# Patient Record
Sex: MALE | Race: WHITE | NOT HISPANIC OR LATINO | URBAN - METROPOLITAN AREA
[De-identification: names, ages, dates, MRNs, and addresses within clinical notes are randomized per-mention and may not be internally consistent; named-entity substitution may affect disease eponyms.]

---

## 2020-08-10 ENCOUNTER — LAB OUTSIDE AN ENCOUNTER (OUTPATIENT)
Dept: URBAN - METROPOLITAN AREA CLINIC 74 | Facility: CLINIC | Age: 80
End: 2020-08-10

## 2020-08-10 ENCOUNTER — OFFICE VISIT (OUTPATIENT)
Dept: URBAN - METROPOLITAN AREA CLINIC 74 | Facility: CLINIC | Age: 80
End: 2020-08-10
Payer: MEDICARE

## 2020-08-10 DIAGNOSIS — K21.9 GASTROESOPHAGEAL REFLUX DISEASE, ESOPHAGITIS PRESENCE NOT SPECIFIED: ICD-10-CM

## 2020-08-10 DIAGNOSIS — K57.30 ACQUIRED DIVERTICULOSIS OF COLON: ICD-10-CM

## 2020-08-10 DIAGNOSIS — Z86.010 PERSONAL HISTORY OF COLONIC POLYPS: ICD-10-CM

## 2020-08-10 DIAGNOSIS — R13.19 OTHER DYSPHAGIA: ICD-10-CM

## 2020-08-10 PROCEDURE — G8754 DIAS BP LESS 90: HCPCS | Performed by: INTERNAL MEDICINE

## 2020-08-10 PROCEDURE — 99204 OFFICE O/P NEW MOD 45 MIN: CPT | Performed by: INTERNAL MEDICINE

## 2020-08-10 PROCEDURE — 99244 OFF/OP CNSLTJ NEW/EST MOD 40: CPT | Performed by: INTERNAL MEDICINE

## 2020-08-10 PROCEDURE — G8752 SYS BP LESS 140: HCPCS | Performed by: INTERNAL MEDICINE

## 2020-08-10 RX ORDER — SODIUM, POTASSIUM,MAG SULFATES 17.5-3.13G
345 ML SOLUTION, RECONSTITUTED, ORAL ORAL
Qty: 1 | Refills: 0 | OUTPATIENT

## 2020-08-10 NOTE — HPI-TODAY'S VISIT:
The patient is an 80-year-old white male patient of Dr. Hernández who is referred for consultation.  The patient states that he was recently seen by primary care after he thought he had developed a hernia and had a CT of the abdomen and pelvis on July 22, 2020.  At the time he was found to have fatty liver, sigmoid diverticulosis with possible early diverticulitis for which he was treated with antibiotics, a small hiatal hernia, and enlarged prostate.  He states that just about a week before that he was treated for a mild case of pneumonia with antibiotics. The patient has a history of colonic polyps, his last colonoscopy dates back about 4 years ago, he has multiple family members with colonic polyps including mother, father, sister and brother.  The patient currently states having normal bowel movements, he had transient constipation while on antibiotics.  He denied any rectal bleeding, abdominal pain, hematochezia or melena. The patient has a history of gastroesophageal reflux, used to take ranitidine, he was able to get off ranitidine but at times does have heartburn and complaints of pill dysphagia.  He had one episode of severe dysphagia to solid food a year ago with a complete esophageal occlusion which lasted a few minutes and resolved spontaneously. The patient is to be scheduled to have an EGD, will follow antireflux measures and diet.  Will be also scheduled to have a surveillance colonoscopy.  Benefits potential complications and alternatives to both procedures have been disclosed.  We will obtain records from [facility] for review.

## 2020-09-14 ENCOUNTER — OFFICE VISIT (OUTPATIENT)
Dept: URBAN - METROPOLITAN AREA SURGERY CENTER 30 | Facility: SURGERY CENTER | Age: 80
End: 2020-09-14
Payer: MEDICARE

## 2020-09-14 ENCOUNTER — CLAIMS CREATED FROM THE CLAIM WINDOW (OUTPATIENT)
Dept: URBAN - METROPOLITAN AREA CLINIC 4 | Facility: CLINIC | Age: 80
End: 2020-09-14
Payer: MEDICARE

## 2020-09-14 DIAGNOSIS — D12.2 ADENOMA OF ASCENDING COLON: ICD-10-CM

## 2020-09-14 DIAGNOSIS — K62.1 DYSPLASTIC POLYP OF RECTUM: ICD-10-CM

## 2020-09-14 DIAGNOSIS — K21.9 ACID REFLUX: ICD-10-CM

## 2020-09-14 DIAGNOSIS — D12.2 BENIGN NEOPLASM OF ASCENDING COLON: ICD-10-CM

## 2020-09-14 DIAGNOSIS — K21.0 GASTRO-ESOPHAGEAL REFLUX DISEASE WITH ESOPHAGITIS: ICD-10-CM

## 2020-09-14 DIAGNOSIS — D12.8 BENIGN NEOPLASM OF RECTUM: ICD-10-CM

## 2020-09-14 DIAGNOSIS — Z86.010 H/O ADENOMATOUS POLYP OF COLON: ICD-10-CM

## 2020-09-14 PROCEDURE — 43239 EGD BIOPSY SINGLE/MULTIPLE: CPT | Performed by: INTERNAL MEDICINE

## 2020-09-14 PROCEDURE — G9936 PMH PLYP/NEO CO/RECT/JUN/ANS: HCPCS | Performed by: INTERNAL MEDICINE

## 2020-09-14 PROCEDURE — G8907 PT DOC NO EVENTS ON DISCHARG: HCPCS | Performed by: INTERNAL MEDICINE

## 2020-09-14 PROCEDURE — 45380 COLONOSCOPY AND BIOPSY: CPT | Performed by: INTERNAL MEDICINE

## 2020-09-14 PROCEDURE — 88305 TISSUE EXAM BY PATHOLOGIST: CPT | Performed by: PATHOLOGY

## 2020-09-14 RX ORDER — SODIUM, POTASSIUM,MAG SULFATES 17.5-3.13G
345 ML SOLUTION, RECONSTITUTED, ORAL ORAL
Qty: 1 | Refills: 0 | Status: ACTIVE | COMMUNITY

## 2020-09-27 PROBLEM — 429969003: Status: ACTIVE | Noted: 2020-08-10

## 2020-09-27 PROBLEM — 118361000119100: Status: ACTIVE | Noted: 2020-08-10

## 2020-09-27 PROBLEM — 397881000: Status: ACTIVE | Noted: 2020-08-10

## 2020-09-27 PROBLEM — 59621000: Status: ACTIVE | Noted: 2020-08-10

## 2020-09-27 PROBLEM — 40890009: Status: ACTIVE | Noted: 2020-08-10

## 2020-09-27 PROBLEM — 721691004: Status: ACTIVE | Noted: 2020-09-27

## 2020-09-27 PROBLEM — 197321007: Status: ACTIVE | Noted: 2020-08-10

## 2020-09-27 PROBLEM — 162863004: Status: ACTIVE | Noted: 2020-08-10

## 2020-09-27 PROBLEM — 266435005: Status: ACTIVE | Noted: 2020-09-27

## 2020-09-27 PROBLEM — 428054006: Status: ACTIVE | Noted: 2020-09-27

## 2020-09-27 PROBLEM — 428283002: Status: ACTIVE | Noted: 2020-08-10

## 2020-09-27 PROBLEM — 84089009: Status: ACTIVE | Noted: 2020-09-27

## 2020-09-29 ENCOUNTER — OFFICE VISIT (OUTPATIENT)
Dept: URBAN - METROPOLITAN AREA CLINIC 74 | Facility: CLINIC | Age: 80
End: 2020-09-29
Payer: MEDICARE

## 2020-09-29 DIAGNOSIS — K62.1 HYPERPLASTIC RECTAL POLYP: ICD-10-CM

## 2020-09-29 DIAGNOSIS — K57.90 DIVERTICULOSIS: ICD-10-CM

## 2020-09-29 DIAGNOSIS — K21.9 GERD WITHOUT ESOPHAGITIS: ICD-10-CM

## 2020-09-29 DIAGNOSIS — R13.10 ESOPHAGEAL DYSPHAGIA: ICD-10-CM

## 2020-09-29 DIAGNOSIS — Z86.010 PERSONAL HISTORY OF COLONIC POLYPS: ICD-10-CM

## 2020-09-29 DIAGNOSIS — Z83.71 FAMILY HISTORY OF COLONIC POLYPS: ICD-10-CM

## 2020-09-29 DIAGNOSIS — Z87.19 HISTORY OF DIVERTICULITIS OF COLON: ICD-10-CM

## 2020-09-29 DIAGNOSIS — D12.2 ADENOMATOUS POLYP OF ASCENDING COLON: ICD-10-CM

## 2020-09-29 DIAGNOSIS — K44.9 HIATAL HERNIA: ICD-10-CM

## 2020-09-29 DIAGNOSIS — I10 ESSENTIAL HYPERTENSION: ICD-10-CM

## 2020-09-29 DIAGNOSIS — E66.3 OVERWEIGHT (BMI 25.0-29.9): ICD-10-CM

## 2020-09-29 DIAGNOSIS — K76.0 FATTY LIVER: ICD-10-CM

## 2020-09-29 PROCEDURE — G8419 CALC BMI OUT NRM PARAM NOF/U: HCPCS | Performed by: INTERNAL MEDICINE

## 2020-09-29 PROCEDURE — G8752 SYS BP LESS 140: HCPCS | Performed by: INTERNAL MEDICINE

## 2020-09-29 PROCEDURE — 1036F TOBACCO NON-USER: CPT | Performed by: INTERNAL MEDICINE

## 2020-09-29 PROCEDURE — G8427 DOCREV CUR MEDS BY ELIG CLIN: HCPCS | Performed by: INTERNAL MEDICINE

## 2020-09-29 PROCEDURE — 99213 OFFICE O/P EST LOW 20 MIN: CPT | Performed by: INTERNAL MEDICINE

## 2020-09-29 PROCEDURE — G8754 DIAS BP LESS 90: HCPCS | Performed by: INTERNAL MEDICINE

## 2020-09-29 RX ORDER — SODIUM, POTASSIUM,MAG SULFATES 17.5-3.13G
345 ML SOLUTION, RECONSTITUTED, ORAL ORAL
Qty: 1 | Refills: 0 | Status: DISCONTINUED | COMMUNITY

## 2020-09-29 RX ORDER — SODIUM, POTASSIUM,MAG SULFATES 17.5-3.13G
345 ML SOLUTION, RECONSTITUTED, ORAL ORAL
Qty: 1 | Refills: 0 | OUTPATIENT

## 2020-09-29 NOTE — HPI-TODAY'S VISIT:
The patient is an 80-year-old white male patient of Dr. Hernández who is referred for consultation.  The patient states that he was recently seen by primary care after he thought he had developed a hernia and had a CT of the abdomen and pelvis on July 22, 2020.  At the time he was found to have fatty liver, sigmoid diverticulosis with possible early diverticulitis for which he was treated with antibiotics, a small hiatal hernia, and enlarged prostate.  He states that just about a week before that he was treated for a mild case of pneumonia with antibiotics. The patient has a history of colonic polyps, his last colonoscopy dates back about 4 years ago, he has multiple family members with colonic polyps including mother, father, sister and brother.  The patient currently states having normal bowel movements, he had transient constipation while on antibiotics.  He denied any rectal bleeding, abdominal pain, hematochezia or melena. The patient has a history of gastroesophageal reflux, used to take ranitidine, he was able to get off ranitidine but at times does have heartburn and complaints of pill dysphagia.  He had one episode of severe dysphagia to solid food a year ago with a complete esophageal occlusion which lasted a few minutes and resolved spontaneously. The patient is to be scheduled to have an EGD, will follow antireflux measures and diet.  Will be also scheduled to have a surveillance colonoscopy.  Benefits potential complications and alternatives to both procedures have been disclosed.  We will obtain records from [facility] for review. Today September 29, 2020 the patient returns for follow-up visit, the patient was last seen in the office August 10, 2020 when he was referred for consultation, the patient had a CT of the abdomen and pelvis on July 22, 2020 and was found to have fatty liver, sigmoid diverticulosis with possible early diverticulitis for which she was treated with antibiotics.  The patient was found to have a small hiatal hernia, and enlarged prostate. The patient stated that he had been treated for pneumonia with antibiotics about a week before the CT was done. The patient has a history of colonic polyps, his last colonoscopy dates back about 4 years, the patient has multiple family members with colonic polyps including mother, father, sister and brother. At the time of the visit the patient was having normal bowel movements, he did have transient constipation while on antibiotics, he denied any rectal bleeding or hematochezia. The patient has a history of gastroesophageal reflux for which he used to take ranitidine, he was able to get off ranitidine but at times does have heartburn and complaints of pill dysphagia.  He claims that about a year ago he had transient complete esophageal dysphagia while eating solid food, it resolved spontaneously.  The patient was scheduled to have an EGD, was advised to follow antireflux measures and diet, he was also scheduled to have a surveillance colonoscopy. The colonoscopy was performed on September 14, 2020 and it showed a 4 mm ascending colon adenomatous polyp which was removed with cold biopsy forceps, 2 sessile hyperplastic rectal polyps measuring 2 mm which were removed with cold biopsy forceps, multiple small and large amount of the diverticuli were found in the sigmoid and descending colon as well as nonbleeding internal hemorrhoids.  The patient will be advised to have a surveillance colonoscopy in 5 years. The EGD was performed on September 14, 2020 and it showed normal duodenal mucosa, a medium sized hiatal hernia, benign fundic gland polyp and esophageal changes compatible with esophagitis without Carmona's. Today the patient states that besides having occasional diet related heartburn and very rare transient distal esophageal dysphagia to very dry foods he is doing well, the patient takes over-the-counter Pepcid AC very occasionally. The patient's bowel movements remain normal. The patient was instructed on the use of antireflux measures and diet, he will also follow a high fiber diet and will return for a follow-up visit in 1 year.

## 2023-07-31 ENCOUNTER — P2P PATIENT RECORD (OUTPATIENT)
Age: 83
End: 2023-07-31

## 2023-09-21 ENCOUNTER — OFFICE VISIT (OUTPATIENT)
Dept: URBAN - METROPOLITAN AREA CLINIC 73 | Facility: CLINIC | Age: 83
End: 2023-09-21
Payer: MEDICARE

## 2023-09-21 ENCOUNTER — OFFICE VISIT (OUTPATIENT)
Dept: URBAN - METROPOLITAN AREA CLINIC 74 | Facility: CLINIC | Age: 83
End: 2023-09-21
Payer: MEDICARE

## 2023-09-21 VITALS
BODY MASS INDEX: 27.43 KG/M2 | HEART RATE: 57 BPM | SYSTOLIC BLOOD PRESSURE: 130 MMHG | DIASTOLIC BLOOD PRESSURE: 80 MMHG | OXYGEN SATURATION: 97 % | WEIGHT: 181 LBS | HEIGHT: 68 IN | TEMPERATURE: 97.3 F

## 2023-09-21 DIAGNOSIS — I10 ESSENTIAL HYPERTENSION: ICD-10-CM

## 2023-09-21 DIAGNOSIS — Z86.010 PERSONAL HISTORY OF COLONIC POLYPS: ICD-10-CM

## 2023-09-21 DIAGNOSIS — K76.0 FATTY LIVER: ICD-10-CM

## 2023-09-21 DIAGNOSIS — K21.9 GERD WITHOUT ESOPHAGITIS: ICD-10-CM

## 2023-09-21 DIAGNOSIS — E66.3 OVERWEIGHT (BMI 25.0-29.9): ICD-10-CM

## 2023-09-21 DIAGNOSIS — D64.89 OTHER SPECIFIED ANEMIAS: ICD-10-CM

## 2023-09-21 DIAGNOSIS — K57.90 DIVERTICULOSIS: ICD-10-CM

## 2023-09-21 DIAGNOSIS — Z83.71 FAMILY HISTORY OF COLONIC POLYPS: ICD-10-CM

## 2023-09-21 DIAGNOSIS — D64.9 ANEMIA, UNSPECIFIED TYPE: ICD-10-CM

## 2023-09-21 DIAGNOSIS — K44.9 HIATAL HERNIA: ICD-10-CM

## 2023-09-21 DIAGNOSIS — Z87.19 HISTORY OF DIVERTICULITIS OF COLON: ICD-10-CM

## 2023-09-21 DIAGNOSIS — D63.8 ANEMIA IN OTHER CHRONIC DISEASES CLASSIFIED ELSEWHERE: ICD-10-CM

## 2023-09-21 PROBLEM — 234347009: Status: ACTIVE | Noted: 2023-09-21

## 2023-09-21 PROBLEM — 271737000: Status: ACTIVE | Noted: 2023-09-21

## 2023-09-21 PROCEDURE — 99244 OFF/OP CNSLTJ NEW/EST MOD 40: CPT | Performed by: INTERNAL MEDICINE

## 2023-09-21 PROCEDURE — 99214 OFFICE O/P EST MOD 30 MIN: CPT | Performed by: INTERNAL MEDICINE

## 2023-09-21 PROCEDURE — G0328 FECAL BLOOD SCRN IMMUNOASSAY: HCPCS | Performed by: INTERNAL MEDICINE

## 2023-09-21 RX ORDER — METOPROLOL SUCCINATE 25 MG/1
1 TABLET TABLET, FILM COATED, EXTENDED RELEASE ORAL ONCE A DAY
Status: ACTIVE | COMMUNITY

## 2023-09-21 RX ORDER — SODIUM, POTASSIUM,MAG SULFATES 17.5-3.13G
345 ML SOLUTION, RECONSTITUTED, ORAL ORAL
Qty: 1 | Refills: 0 | Status: ACTIVE | COMMUNITY

## 2023-09-21 RX ORDER — OLMESARTAN MEDOXOMIL 20 MG/1
1 TABLET TABLET, FILM COATED ORAL ONCE A DAY
Status: ACTIVE | COMMUNITY

## 2023-09-21 RX ORDER — SODIUM, POTASSIUM,MAG SULFATES 17.5-3.13G
345 ML SOLUTION, RECONSTITUTED, ORAL ORAL
Qty: 1 | Refills: 0 | OUTPATIENT

## 2023-09-21 NOTE — HPI-TODAY'S VISIT:
The patient is an 80-year-old white male patient of Dr. Hernández who is referred for consultation.  The patient states that he was recently seen by primary care after he thought he had developed a hernia and had a CT of the abdomen and pelvis on July 22, 2020.  At the time he was found to have fatty liver, sigmoid diverticulosis with possible early diverticulitis for which he was treated with antibiotics, a small hiatal hernia, and enlarged prostate.  He states that just about a week before that he was treated for a mild case of pneumonia with antibiotics. The patient has a history of colonic polyps, his last colonoscopy dates back about 4 years ago, he has multiple family members with colonic polyps including mother, father, sister and brother.  The patient currently states having normal bowel movements, he had transient constipation while on antibiotics.  He denied any rectal bleeding, abdominal pain, hematochezia or melena. The patient has a history of gastroesophageal reflux, used to take ranitidine, he was able to get off ranitidine but at times does have heartburn and complaints of pill dysphagia.  He had one episode of severe dysphagia to solid food a year ago with a complete esophageal occlusion which lasted a few minutes and resolved spontaneously. The patient is to be scheduled to have an EGD, will follow antireflux measures and diet.  Will be also scheduled to have a surveillance colonoscopy.  Benefits potential complications and alternatives to both procedures have been disclosed.  We will obtain records from [facility] for review. Today September 29, 2020 the patient returns for follow-up visit, the patient was last seen in the office August 10, 2020 when he was referred for consultation, the patient had a CT of the abdomen and pelvis on July 22, 2020 and was found to have fatty liver, sigmoid diverticulosis with possible early diverticulitis for which she was treated with antibiotics.  The patient was found to have a small hiatal hernia, and enlarged prostate. The patient stated that he had been treated for pneumonia with antibiotics about a week before the CT was done. The patient has a history of colonic polyps, his last colonoscopy dates back about 4 years, the patient has multiple family members with colonic polyps including mother, father, sister and brother. At the time of the visit the patient was having normal bowel movements, he did have transient constipation while on antibiotics, he denied any rectal bleeding or hematochezia. The patient has a history of gastroesophageal reflux for which he used to take ranitidine, he was able to get off ranitidine but at times does have heartburn and complaints of pill dysphagia.  He claims that about a year ago he had transient complete esophageal dysphagia while eating solid food, it resolved spontaneously.  The patient was scheduled to have an EGD, was advised to follow antireflux measures and diet, he was also scheduled to have a surveillance colonoscopy. The colonoscopy was performed on September 14, 2020 and it showed a 4 mm ascending colon adenomatous polyp which was removed with cold biopsy forceps, 2 sessile hyperplastic rectal polyps measuring 2 mm which were removed with cold biopsy forceps, multiple small and large amount of the diverticuli were found in the sigmoid and descending colon as well as nonbleeding internal hemorrhoids.  The patient will be advised to have a surveillance colonoscopy in 5 years. The EGD was performed on September 14, 2020 and it showed normal duodenal mucosa, a medium sized hiatal hernia, benign fundic gland polyp and esophageal changes compatible with esophagitis without Carmona's. Today the patient states that besides having occasional diet related heartburn and very rare transient distal esophageal dysphagia to very dry foods he is doing well, the patient takes over-the-counter Pepcid AC very occasionally. The patient's bowel movements remain normal. The patient was instructed on the use of antireflux measures and diet, he will also follow a high fiber diet and will return for a follow-up visit in 1 year.   Today September 21 2023 the patient returns for a follow-up visit, the patient was last seen on September 29, 2020 with adenomatous and hyperplastic colon polyps, GERD, hiatal hernia, esophageal dysphagia, colonic diverticulosis with history of diverticulitis, fatty liver, the patient was overweight, the patient had family history of colon polyps.  At the time of the last visit the patient had occasional diet related heartburn, very transient distal esophageal dysphagia with very dry foods, the patient was taking over-the-counter Pepcid AC very occasionally, bowel movements were normal.  At the time the patient was advised to follow a high-fiber diet as well as antireflux measures and diet and return for a follow-up visit in 1 year.  The patient had an EGD performed September 14, 2020 that showed normal duodenal mucosa, a medium sized hiatal hernia, benign fundic gland polyposis and esophageal changes compatible with esophagitis without Carmona's.   A Colonoscopy performed September 14, 2020 showed a 4 mm ascending adenomatous polyp removed with cold biopsy forceps, 2 sessile hyperplastic rectal polyps measuring 2 mm each removed with cold biopsy forceps.  The patient had many diverticula throughout the sigmoid and descending colon and internal hemorrhoids.  At the time the patient was advised to get a colonoscopy in 2025.  Laboratory obtained on September 5, 2023 showed an H&H of 11.5 and 36.2, white cell count 9.84, normal indices, the patient had normal renal function and normal LFTs.  A CT of the chest obtained May 5, 2023 revealed a small hiatal hernia, a 2 cm left thyroid nodule, and a 4.2 cm ascending thoracic aortic aneurysm.  There was some axillary lymphadenopathy and the patient had pulmonary nodules.  The patient was also found to have colonic diverticulosis in the descending colon.  Today the patient returns to the office stating that he did undergo a low back operation where they had a cage bleeding to his lower spine and unfortunately it got complicated with a Pseudomonas infection which was treated with Cipro for 1 year.  The patient just came off antibiotics just over a month ago.  The patient while having his evaluation by primary care was found to have iron deficiency on August 23, 2023 with a serum iron of 32, percent saturation of 13, ferritin 181.  At the time his folate was normal.  The patient had normal LFTs and normal renal function.  As previously stated H&H 11.5 and 36.2.  The patient was then advised to be seen by gastroenterology for possible GI blood loss.  Currently the patient denies having any dysphagia, odynophagia, nausea, vomiting, hematemesis or melena, there has been no hematochezia or rectal bleeding.  The patient was given iron replacement tablets which she took for 10 days and could not tolerate them due to abdominal discomfort and constipation so they were suspended.  The patient is currently having regular formed stools with no blood, mostly type IV on the Gray scale.  The patient has been recommended to get stool checked for occult blood, we will repeat iron studies and ferritin.  We will make further recommendations once we get results.

## 2023-09-22 LAB
FERRITIN, SERUM: 52
IRON BIND.CAP.(TIBC): 303
IRON SATURATION: 27
IRON: 81

## 2023-09-28 ENCOUNTER — TELEPHONE ENCOUNTER (OUTPATIENT)
Dept: URBAN - METROPOLITAN AREA CLINIC 74 | Facility: CLINIC | Age: 83
End: 2023-09-28

## 2023-10-01 PROBLEM — 724556004: Status: ACTIVE | Noted: 2023-10-01

## 2023-10-10 ENCOUNTER — OFFICE VISIT (OUTPATIENT)
Dept: URBAN - METROPOLITAN AREA CLINIC 74 | Facility: CLINIC | Age: 83
End: 2023-10-10

## 2023-10-10 RX ORDER — METOPROLOL SUCCINATE 25 MG/1
1 TABLET TABLET, FILM COATED, EXTENDED RELEASE ORAL ONCE A DAY
Status: ACTIVE | COMMUNITY

## 2023-10-10 RX ORDER — OLMESARTAN MEDOXOMIL 20 MG/1
1 TABLET TABLET, FILM COATED ORAL ONCE A DAY
Status: ACTIVE | COMMUNITY

## 2023-10-10 RX ORDER — SODIUM, POTASSIUM,MAG SULFATES 17.5-3.13G
345 ML SOLUTION, RECONSTITUTED, ORAL ORAL
Qty: 1 | Refills: 0 | Status: ON HOLD | COMMUNITY

## 2023-10-10 NOTE — HPI-TODAY'S VISIT:
The is 83-year-old male with past medical history as noted below known to Dr. Grey is returning to our clinic for follow-up appointment with recent history of blood in his stool, heme positive stool.  The patient follows at [facility] oncology group for anemia of chronic disease and iron deficiency anemia recent hemoglobin 11.5 and hematocrit 36.2.  Diagnostic studies: -- Labs 09/21/2023 with normal Fe panel, Ferritin, B12, and Folic acid except heme positive stool.   Procedures: -- EGD with biopsy on 09/14/2020 by Dr. Grey noted normal examined duodenum.  Normal mucosa was found in the entire examined stomach.  Medium size hiatal hernia.  A single mucosal papula or nodule found in the stomach.  LA grade A reflux esophagitis.  Biopsy of esophagus with reflux type changes.  No Carmona's esophagus or eosinophilic esophagitis.  Stomach with foveolar hyperplasia consistent with reflux associated polyp.  No H. pylori organisms or intestinal metaplasia.  -- Colonoscopy with polypectomy on 09/14/2020 by Dr. Grey noted one 4 mm polyp in the ascending colon, removed with a cold biopsy forceps.  Resected and retrieved. Two polyps in the rectum, removed with a cold biopsy forcep.  Resected and retrieved.  Diverticulosis in sigmoid colon and in the descending colon.  Non-bleeding internal hemorrhoids.  The examination was otherwise normal.  Repeat colonoscopy in 5 years for surveillance.  Biopsy with tubular adenoma and hyperplastic colon polyps. 02-Mar-2017 15:31

## 2023-10-13 ENCOUNTER — OFFICE VISIT (OUTPATIENT)
Dept: URBAN - METROPOLITAN AREA CLINIC 74 | Facility: CLINIC | Age: 83
End: 2023-10-13

## 2023-10-19 ENCOUNTER — DASHBOARD ENCOUNTERS (OUTPATIENT)
Age: 83
End: 2023-10-19

## 2023-10-26 ENCOUNTER — CLAIMS CREATED FROM THE CLAIM WINDOW (OUTPATIENT)
Dept: URBAN - METROPOLITAN AREA CLINIC 74 | Facility: CLINIC | Age: 83
End: 2023-10-26
Payer: MEDICARE

## 2023-10-26 ENCOUNTER — LAB OUTSIDE AN ENCOUNTER (OUTPATIENT)
Dept: URBAN - METROPOLITAN AREA CLINIC 74 | Facility: CLINIC | Age: 83
End: 2023-10-26

## 2023-10-26 VITALS
HEART RATE: 78 BPM | BODY MASS INDEX: 27.74 KG/M2 | TEMPERATURE: 97.5 F | WEIGHT: 183 LBS | HEIGHT: 68 IN | SYSTOLIC BLOOD PRESSURE: 132 MMHG | OXYGEN SATURATION: 95 % | DIASTOLIC BLOOD PRESSURE: 80 MMHG

## 2023-10-26 DIAGNOSIS — Z87.19 HISTORY OF DIVERTICULITIS OF COLON: ICD-10-CM

## 2023-10-26 DIAGNOSIS — R13.19 ESOPHAGEAL DYSPHAGIA: ICD-10-CM

## 2023-10-26 DIAGNOSIS — Z86.010 PERSONAL HISTORY OF COLONIC POLYPS: ICD-10-CM

## 2023-10-26 DIAGNOSIS — K76.0 FATTY LIVER: ICD-10-CM

## 2023-10-26 DIAGNOSIS — D63.8 ANEMIA IN OTHER CHRONIC DISEASES CLASSIFIED ELSEWHERE: ICD-10-CM

## 2023-10-26 DIAGNOSIS — E66.3 OVERWEIGHT (BMI 25.0-29.9): ICD-10-CM

## 2023-10-26 DIAGNOSIS — K21.9 GERD WITHOUT ESOPHAGITIS: ICD-10-CM

## 2023-10-26 DIAGNOSIS — K44.9 HIATAL HERNIA: ICD-10-CM

## 2023-10-26 DIAGNOSIS — Z83.71 FAMILY HISTORY OF COLONIC POLYPS: ICD-10-CM

## 2023-10-26 DIAGNOSIS — I10 ESSENTIAL HYPERTENSION: ICD-10-CM

## 2023-10-26 DIAGNOSIS — R19.5 OCCULT BLOOD POSITIVE STOOL: ICD-10-CM

## 2023-10-26 PROBLEM — 59614000: Status: ACTIVE | Noted: 2023-10-26

## 2023-10-26 PROBLEM — 40890009: Status: ACTIVE | Noted: 2023-10-26

## 2023-10-26 PROCEDURE — 99213 OFFICE O/P EST LOW 20 MIN: CPT | Performed by: INTERNAL MEDICINE

## 2023-10-26 RX ORDER — METOPROLOL SUCCINATE 25 MG/1
1 TABLET TABLET, FILM COATED, EXTENDED RELEASE ORAL ONCE A DAY
Status: ACTIVE | COMMUNITY

## 2023-10-26 RX ORDER — OLMESARTAN MEDOXOMIL 20 MG/1
1 TABLET TABLET, FILM COATED ORAL ONCE A DAY
Status: ACTIVE | COMMUNITY

## 2023-10-26 NOTE — HPI-TODAY'S VISIT:
The patient is an 80-year-old white male patient of Dr. Hernández who is referred for consultation.  The patient states that he was recently seen by primary care after he thought he had developed a hernia and had a CT of the abdomen and pelvis on July 22, 2020.  At the time he was found to have fatty liver, sigmoid diverticulosis with possible early diverticulitis for which he was treated with antibiotics, a small hiatal hernia, and enlarged prostate.  He states that just about a week before that he was treated for a mild case of pneumonia with antibiotics. The patient has a history of colonic polyps, his last colonoscopy dates back about 4 years ago, he has multiple family members with colonic polyps including mother, father, sister and brother.  The patient currently states having normal bowel movements, he had transient constipation while on antibiotics.  He denied any rectal bleeding, abdominal pain, hematochezia or melena. The patient has a history of gastroesophageal reflux, used to take ranitidine, he was able to get off ranitidine but at times does have heartburn and complaints of pill dysphagia.  He had one episode of severe dysphagia to solid food a year ago with a complete esophageal occlusion which lasted a few minutes and resolved spontaneously. The patient is to be scheduled to have an EGD, will follow antireflux measures and diet.  Will be also scheduled to have a surveillance colonoscopy.  Benefits potential complications and alternatives to both procedures have been disclosed.  We will obtain records from [facility] for review. Today September 29, 2020 the patient returns for follow-up visit, the patient was last seen in the office August 10, 2020 when he was referred for consultation, the patient had a CT of the abdomen and pelvis on July 22, 2020 and was found to have fatty liver, sigmoid diverticulosis with possible early diverticulitis for which she was treated with antibiotics.  The patient was found to have a small hiatal hernia, and enlarged prostate. The patient stated that he had been treated for pneumonia with antibiotics about a week before the CT was done. The patient has a history of colonic polyps, his last colonoscopy dates back about 4 years, the patient has multiple family members with colonic polyps including mother, father, sister and brother. At the time of the visit the patient was having normal bowel movements, he did have transient constipation while on antibiotics, he denied any rectal bleeding or hematochezia. The patient has a history of gastroesophageal reflux for which he used to take ranitidine, he was able to get off ranitidine but at times does have heartburn and complaints of pill dysphagia.  He claims that about a year ago he had transient complete esophageal dysphagia while eating solid food, it resolved spontaneously.  The patient was scheduled to have an EGD, was advised to follow antireflux measures and diet, he was also scheduled to have a surveillance colonoscopy. The colonoscopy was performed on September 14, 2020 and it showed a 4 mm ascending colon adenomatous polyp which was removed with cold biopsy forceps, 2 sessile hyperplastic rectal polyps measuring 2 mm which were removed with cold biopsy forceps, multiple small and large amount of the diverticuli were found in the sigmoid and descending colon as well as nonbleeding internal hemorrhoids.  The patient will be advised to have a surveillance colonoscopy in 5 years. The EGD was performed on September 14, 2020 and it showed normal duodenal mucosa, a medium sized hiatal hernia, benign fundic gland polyp and esophageal changes compatible with esophagitis without Carmona's. Today the patient states that besides having occasional diet related heartburn and very rare transient distal esophageal dysphagia to very dry foods he is doing well, the patient takes over-the-counter Pepcid AC very occasionally. The patient's bowel movements remain normal. The patient was instructed on the use of antireflux measures and diet, he will also follow a high fiber diet and will return for a follow-up visit in 1 year.   Today September 21 2023 the patient returns for a follow-up visit, the patient was last seen on September 29, 2020 with adenomatous and hyperplastic colon polyps, GERD, hiatal hernia, esophageal dysphagia, colonic diverticulosis with history of diverticulitis, fatty liver, the patient was overweight, the patient had family history of colon polyps.  At the time of the last visit the patient had occasional diet related heartburn, very transient distal esophageal dysphagia with very dry foods, the patient was taking over-the-counter Pepcid AC very occasionally, bowel movements were normal.  At the time the patient was advised to follow a high-fiber diet as well as antireflux measures and diet and return for a follow-up visit in 1 year.  The patient had an EGD performed September 14, 2020 that showed normal duodenal mucosa, a medium sized hiatal hernia, benign fundic gland polyposis and esophageal changes compatible with esophagitis without Carmona's.   A Colonoscopy performed September 14, 2020 showed a 4 mm ascending adenomatous polyp removed with cold biopsy forceps, 2 sessile hyperplastic rectal polyps measuring 2 mm each removed with cold biopsy forceps.  The patient had many diverticula throughout the sigmoid and descending colon and internal hemorrhoids.  At the time the patient was advised to get a colonoscopy in 2025.  Laboratory obtained on September 5, 2023 showed an H&H of 11.5 and 36.2, white cell count 9.84, normal indices, the patient had normal renal function and normal LFTs.  A CT of the chest obtained May 5, 2023 revealed a small hiatal hernia, a 2 cm left thyroid nodule, and a 4.2 cm ascending thoracic aortic aneurysm.  There was some axillary lymphadenopathy and the patient had pulmonary nodules.  The patient was also found to have colonic diverticulosis in the descending colon.  Today the patient returns to the office stating that he did undergo a low back operation where they had a cage bleeding to his lower spine and unfortunately it got complicated with a Pseudomonas infection which was treated with Cipro for 1 year.  The patient just came off antibiotics just over a month ago.  The patient while having his evaluation by primary care was found to have iron deficiency on August 23, 2023 with a serum iron of 32, percent saturation of 13, ferritin 181.  At the time his folate was normal.  The patient had normal LFTs and normal renal function.  As previously stated H&H 11.5 and 36.2.  The patient was then advised to be seen by gastroenterology for possible GI blood loss.  Currently the patient denies having any dysphagia, odynophagia, nausea, vomiting, hematemesis or melena, there has been no hematochezia or rectal bleeding.  The patient was given iron replacement tablets which she took for 10 days and could not tolerate them due to abdominal discomfort and constipation so they were suspended.  The patient is currently having regular formed stools with no blood, mostly type IV on the Winston scale.  The patient has been recommended to get stool checked for occult blood, we will repeat iron studies and ferritin.  We will make further recommendations once we get results.  Today October 26 2023 the patient returns for a follow-up visit, the patient was last seen on September 21, 2023 with a hiatal hernia, GERD without esophagitis, personal history of colonic polyps, family history of colonic polyps, colonic diverticulosis, history of diverticulitis, fatty liver, the patient was overweight, the patient had unspecified anemia possibly due to chronic disease and hypertension.  At the time of the visit the patient returned after having extensive back surgery, the patient had an implanted cage into his back, unfortunately these got complicated with a Pseudomonas infection which was treated with Cipro for 1 year.  The patient managed to get off antibiotics about a month before the visit.  At the time of his evaluation by primary care he was found to have iron deficiency, his serum iron was 32, percent saturation of 13 with a ferritin of 181.  Folate was normal the patient had normal renal function and normal LFTs.  H&H were 11.5 and 36.2.  The patient denied having any upper GI symptoms such as dysphagia, odynophagia, nausea, vomiting, hematemesis or melena, there was no rectal bleeding or hematochezia.  The patient was receiving iron replacement by mouth which she took for 10 days but could not tolerate them due to abdominal discomfort and constipation so he stopped taking them.  The patient was having normal type IV Winston scale stools with no blood.  At the time we recommended to the patient to get iron studies and a Hemosure.  Iron studies obtained on September 22, 2023 were normal including an iron of 81% saturation 27 normal TIBC.  The ferritin was 52.  The patient had a positive Hemosure.  I discussed with the patient on the phone the findings and we agreed to for now continue to follow his H&H rather than undertaking any invasive testing, the patient was still recovering from his previous back surgery and long-term infection.  On Levan 2023 revealed an HH of 12.5 and 38 with normocytic normochromic indices, normal white cell count platelet count and differential.The patient's iron studies revealed an iron of 136, percent saturation 47 and ferritin 57, the patient's renal function and LFTs were normal.  Today the patientReturns to the office with his wife and they state that the patient has been seen twice in the emergency room with a food impaction, by the time he was seen the food had traveled into the stomach.  The patient had 2 previous episodes where he had to stop in the middle of the road to force emesis to relieve the food impaction.  Currently the patient denies having any heartburn, there has been no recent hematemesis or melena, no abdominal or chest pain and no weight loss.  Bowel movements remain normal, free of any red blood or hematochezia.I discussed with the patient the most recent lab work which revealed an improvement on the iron and improvement on the H and H.  The patient agreed to get a barium swallow with liquid barium and a barium tablet and will be scheduled to have an EGD with possible dilation at the hospital.  The patient most probably will need balloon dilation.  We will also obtain a Hemosure in view of the previous history of occult blood in the stool. The patient will return for a follow-up visit after we complete his evaluation and treatment.

## 2023-10-27 ENCOUNTER — OFFICE VISIT (OUTPATIENT)
Dept: URBAN - METROPOLITAN AREA CLINIC 73 | Facility: CLINIC | Age: 83
End: 2023-10-27
Payer: MEDICARE

## 2023-10-27 DIAGNOSIS — K92.1 BLOOD IN STOOL: ICD-10-CM

## 2023-10-27 PROCEDURE — 82274 ASSAY TEST FOR BLOOD FECAL: CPT | Performed by: INTERNAL MEDICINE

## 2024-01-19 ENCOUNTER — TELEPHONE ENCOUNTER (OUTPATIENT)
Dept: URBAN - METROPOLITAN AREA CLINIC 74 | Facility: CLINIC | Age: 84
End: 2024-01-19

## 2024-01-22 ENCOUNTER — TELEPHONE ENCOUNTER (OUTPATIENT)
Dept: URBAN - METROPOLITAN AREA CLINIC 40 | Facility: CLINIC | Age: 84
End: 2024-01-22

## 2024-01-31 ENCOUNTER — OFFICE VISIT (OUTPATIENT)
Dept: URBAN - METROPOLITAN AREA MEDICAL CENTER 30 | Facility: MEDICAL CENTER | Age: 84
End: 2024-01-31

## 2024-02-28 ENCOUNTER — OV EP (OUTPATIENT)
Dept: URBAN - METROPOLITAN AREA CLINIC 74 | Facility: CLINIC | Age: 84
End: 2024-02-28

## 2024-02-28 RX ORDER — OLMESARTAN MEDOXOMIL 20 MG/1
1 TABLET TABLET, FILM COATED ORAL ONCE A DAY
Status: ACTIVE | COMMUNITY

## 2024-02-28 RX ORDER — METOPROLOL SUCCINATE 25 MG/1
1 TABLET TABLET, FILM COATED, EXTENDED RELEASE ORAL ONCE A DAY
Status: ACTIVE | COMMUNITY

## 2024-02-28 NOTE — HPI-TODAY'S VISIT:
The patient is an 80-year-old white male patient of Dr. Hernández who is referred for consultation.  The patient states that he was recently seen by primary care after he thought he had developed a hernia and had a CT of the abdomen and pelvis on July 22, 2020.  At the time he was found to have fatty liver, sigmoid diverticulosis with possible early diverticulitis for which he was treated with antibiotics, a small hiatal hernia, and enlarged prostate.  He states that just about a week before that he was treated for a mild case of pneumonia with antibiotics. The patient has a history of colonic polyps, his last colonoscopy dates back about 4 years ago, he has multiple family members with colonic polyps including mother, father, sister and brother.  The patient currently states having normal bowel movements, he had transient constipation while on antibiotics.  He denied any rectal bleeding, abdominal pain, hematochezia or melena. The patient has a history of gastroesophageal reflux, used to take ranitidine, he was able to get off ranitidine but at times does have heartburn and complaints of pill dysphagia.  He had one episode of severe dysphagia to solid food a year ago with a complete esophageal occlusion which lasted a few minutes and resolved spontaneously. The patient is to be scheduled to have an EGD, will follow antireflux measures and diet.  Will be also scheduled to have a surveillance colonoscopy.  Benefits potential complications and alternatives to both procedures have been disclosed.  We will obtain records from [facility] for review. Today September 29, 2020 the patient returns for follow-up visit, the patient was last seen in the office August 10, 2020 when he was referred for consultation, the patient had a CT of the abdomen and pelvis on July 22, 2020 and was found to have fatty liver, sigmoid diverticulosis with possible early diverticulitis for which she was treated with antibiotics.  The patient was found to have a small hiatal hernia, and enlarged prostate. The patient stated that he had been treated for pneumonia with antibiotics about a week before the CT was done. The patient has a history of colonic polyps, his last colonoscopy dates back about 4 years, the patient has multiple family members with colonic polyps including mother, father, sister and brother. At the time of the visit the patient was having normal bowel movements, he did have transient constipation while on antibiotics, he denied any rectal bleeding or hematochezia. The patient has a history of gastroesophageal reflux for which he used to take ranitidine, he was able to get off ranitidine but at times does have heartburn and complaints of pill dysphagia.  He claims that about a year ago he had transient complete esophageal dysphagia while eating solid food, it resolved spontaneously.  The patient was scheduled to have an EGD, was advised to follow antireflux measures and diet, he was also scheduled to have a surveillance colonoscopy. The colonoscopy was performed on September 14, 2020 and it showed a 4 mm ascending colon adenomatous polyp which was removed with cold biopsy forceps, 2 sessile hyperplastic rectal polyps measuring 2 mm which were removed with cold biopsy forceps, multiple small and large amount of the diverticuli were found in the sigmoid and descending colon as well as nonbleeding internal hemorrhoids.  The patient will be advised to have a surveillance colonoscopy in 5 years. The EGD was performed on September 14, 2020 and it showed normal duodenal mucosa, a medium sized hiatal hernia, benign fundic gland polyp and esophageal changes compatible with esophagitis without Carmona's. Today the patient states that besides having occasional diet related heartburn and very rare transient distal esophageal dysphagia to very dry foods he is doing well, the patient takes over-the-counter Pepcid AC very occasionally. The patient's bowel movements remain normal. The patient was instructed on the use of antireflux measures and diet, he will also follow a high fiber diet and will return for a follow-up visit in 1 year.   Today September 21 2023 the patient returns for a follow-up visit, the patient was last seen on September 29, 2020 with adenomatous and hyperplastic colon polyps, GERD, hiatal hernia, esophageal dysphagia, colonic diverticulosis with history of diverticulitis, fatty liver, the patient was overweight, the patient had family history of colon polyps.  At the time of the last visit the patient had occasional diet related heartburn, very transient distal esophageal dysphagia with very dry foods, the patient was taking over-the-counter Pepcid AC very occasionally, bowel movements were normal.  At the time the patient was advised to follow a high-fiber diet as well as antireflux measures and diet and return for a follow-up visit in 1 year.  The patient had an EGD performed September 14, 2020 that showed normal duodenal mucosa, a medium sized hiatal hernia, benign fundic gland polyposis and esophageal changes compatible with esophagitis without Carmona's.   A Colonoscopy performed September 14, 2020 showed a 4 mm ascending adenomatous polyp removed with cold biopsy forceps, 2 sessile hyperplastic rectal polyps measuring 2 mm each removed with cold biopsy forceps.  The patient had many diverticula throughout the sigmoid and descending colon and internal hemorrhoids.  At the time the patient was advised to get a colonoscopy in 2025.  Laboratory obtained on September 5, 2023 showed an H&H of 11.5 and 36.2, white cell count 9.84, normal indices, the patient had normal renal function and normal LFTs.  A CT of the chest obtained May 5, 2023 revealed a small hiatal hernia, a 2 cm left thyroid nodule, and a 4.2 cm ascending thoracic aortic aneurysm.  There was some axillary lymphadenopathy and the patient had pulmonary nodules.  The patient was also found to have colonic diverticulosis in the descending colon.  Today the patient returns to the office stating that he did undergo a low back operation where they had a cage bleeding to his lower spine and unfortunately it got complicated with a Pseudomonas infection which was treated with Cipro for 1 year.  The patient just came off antibiotics just over a month ago.  The patient while having his evaluation by primary care was found to have iron deficiency on August 23, 2023 with a serum iron of 32, percent saturation of 13, ferritin 181.  At the time his folate was normal.  The patient had normal LFTs and normal renal function.  As previously stated H&H 11.5 and 36.2.  The patient was then advised to be seen by gastroenterology for possible GI blood loss.  Currently the patient denies having any dysphagia, odynophagia, nausea, vomiting, hematemesis or melena, there has been no hematochezia or rectal bleeding.  The patient was given iron replacement tablets which she took for 10 days and could not tolerate them due to abdominal discomfort and constipation so they were suspended.  The patient is currently having regular formed stools with no blood, mostly type IV on the Wolfe scale.  The patient has been recommended to get stool checked for occult blood, we will repeat iron studies and ferritin.  We will make further recommendations once we get results.  Today October 26 2023 the patient returns for a follow-up visit, the patient was last seen on September 21, 2023 with a hiatal hernia, GERD without esophagitis, personal history of colonic polyps, family history of colonic polyps, colonic diverticulosis, history of diverticulitis, fatty liver, the patient was overweight, the patient had unspecified anemia possibly due to chronic disease and hypertension.  At the time of the visit the patient returned after having extensive back surgery, the patient had an implanted cage into his back, unfortunately these got complicated with a Pseudomonas infection which was treated with Cipro for 1 year.  The patient managed to get off antibiotics about a month before the visit.  At the time of his evaluation by primary care he was found to have iron deficiency, his serum iron was 32, percent saturation of 13 with a ferritin of 181.  Folate was normal the patient had normal renal function and normal LFTs.  H&H were 11.5 and 36.2.  The patient denied having any upper GI symptoms such as dysphagia, odynophagia, nausea, vomiting, hematemesis or melena, there was no rectal bleeding or hematochezia.  The patient was receiving iron replacement by mouth which she took for 10 days but could not tolerate them due to abdominal discomfort and constipation so he stopped taking them.  The patient was having normal type IV Wolfe scale stools with no blood.  At the time we recommended to the patient to get iron studies and a Hemosure.  Iron studies obtained on September 22, 2023 were normal including an iron of 81% saturation 27 normal TIBC.  The ferritin was 52.  The patient had a positive Hemosure.  I discussed with the patient on the phone the findings and we agreed to for now continue to follow his H&H rather than undertaking any invasive testing, the patient was still recovering from his previous back surgery and long-term infection.  On Levan 2023 revealed an HH of 12.5 and 38 with normocytic normochromic indices, normal white cell count platelet count and differential.The patient's iron studies revealed an iron of 136, percent saturation 47 and ferritin 57, the patient's renal function and LFTs were normal.  Today the patientReturns to the office with his wife and they state that the patient has been seen twice in the emergency room with a food impaction, by the time he was seen the food had traveled into the stomach.  The patient had 2 previous episodes where he had to stop in the middle of the road to force emesis to relieve the food impaction.  Currently the patient denies having any heartburn, there has been no recent hematemesis or melena, no abdominal or chest pain and no weight loss.  Bowel movements remain normal, free of any red blood or hematochezia.I discussed with the patient the most recent lab work which revealed an improvement on the iron and improvement on the H and H.  The patient agreed to get a barium swallow with liquid barium and a barium tablet and will be scheduled to have an EGD with possible dilation at the hospital.  The patient most probably will need balloon dilation.  We will also obtain a Hemosure in view of the previous history of occult blood in the stool. The patient will return for a follow-up visit after we complete his evaluation and treatment.  Today February 28, 2024 the patient returns for a follow-up visit, the patient was last seen on October 26, 2023 with esophageal dysphagia, hiatal hernia, GERD without esophagitis, personal history of colonic polyps, family history of colonic polyps, history of colonic diverticulosis and diverticulitis, anemia, positive stool for occult blood, fatty liver, the patient was overweight.At the time of the visit the patient stated that he has been seen twice in the emergency room with a food impaction, by the time the patient was seen the food had traveled into the stomach.  The patient had 2 previous episodes where he had to stop in the middle of the road and forced emesis to remove food impaction. At the time of the visit the patient denies having any heartburn, there was no recent hematemesis or melena, no abdominal or chest pain and no weight loss.  Bowel movements were normal, free of blood. At the time of the visit we discussed the most recent lab work which revealed an improvement on the iron, improvement on the hemoglobin and hematocrit.  The patient did agree to get the barium swallow with liquid barium and a barium tablet and have an EGD scheduled with possible dilation.  The patient was to get a Hemosure revealed the past history of occult blood in the stool. There is no evidence that the patient had the barium swallow, upper endoscopy. The patient's Hemosure on October 27, 2023 was negative